# Patient Record
Sex: MALE | Race: WHITE | NOT HISPANIC OR LATINO | URBAN - METROPOLITAN AREA
[De-identification: names, ages, dates, MRNs, and addresses within clinical notes are randomized per-mention and may not be internally consistent; named-entity substitution may affect disease eponyms.]

---

## 2017-02-10 ENCOUNTER — OFFICE (OUTPATIENT)
Dept: URBAN - METROPOLITAN AREA CLINIC 70 | Facility: CLINIC | Age: 37
End: 2017-02-10

## 2017-02-10 VITALS
DIASTOLIC BLOOD PRESSURE: 68 MMHG | SYSTOLIC BLOOD PRESSURE: 120 MMHG | HEIGHT: 67 IN | WEIGHT: 185 LBS | HEART RATE: 72 BPM

## 2017-02-10 DIAGNOSIS — D64.9 ANEMIA, UNSPECIFIED: ICD-10-CM

## 2017-02-10 DIAGNOSIS — R10.11 RIGHT UPPER QUADRANT PAIN: ICD-10-CM

## 2017-02-10 DIAGNOSIS — R53.83 OTHER FATIGUE: ICD-10-CM

## 2017-02-10 DIAGNOSIS — R94.5 ABNORMAL RESULTS OF LIVER FUNCTION STUDIES: ICD-10-CM

## 2017-02-10 PROCEDURE — 99205 OFFICE O/P NEW HI 60 MIN: CPT | Performed by: INTERNAL MEDICINE

## 2017-02-10 NOTE — SERVICEHPINOTES
Patient here for evaluation. As you know, he saw you December 20, 2016 in the office. He has a history of vitamin D deficiency, hyperlipidemia. There is mention of recent onset of fatigue, muscle ache, loss of appetite. He is been using Advil to help treat some of his symptoms. He had also been mentioning some chills recently. Given his complaints of myalgia, chills, routine blood chemistries were performed.BRBlood work revealed a drop in hemoglobin. There was an approximate 2-3 g decrease. Further testing including iron studies, stool studies, LDH were requested.BRGiven his diminished appetite, recent NSAID use, drop in hemoglobin, he was advised to followup with us in the office. Next.CPK was found be normal. Interestingly, liver enzymes were elevated, AST 98, , alkaline phosphatase 259. Stool Hemoccults were found be negative. Viral hepatitis panel was ordered, negative for hepatitis A, B, C. Next.Followup hemoglobin actually showed improvement. Hemoglobin 13.0, increased up to 13.6. Ciera-Sanders, CMV titers have been ordered. Lipase ordered. Right upper quadrant ultrasound was also requested. Patient to have this study performed later today.patient states that over the past 2-3 weeks.  He's had fatigue.  This is somewhat new for him.  Normally he has lots of energy.  He states that he feels wiped out.  After coming home from work he would simply follow sleep.  He's been noticing some chills.  No fever.  Today he mentions he has some vague right upper quadrant discomfort, sensation.  He feels like his chills, body aches have improved but still feels somewhat fatigued.  He denies any sick contacts, travel.  No new medication.  No herbal supplements.  He does not drink alcohol.  As mentioned he has lost about 45 pounds, he is in a weight loss program since November of 2016.  He does not drink any alcohol.  He denies any history of gallbladder disease.  No liver disease.  He does not donate blood.  He denies any rectal bleeding, hematemesis.  He has been using some anti-inflammatories, 400 mg twice a day until recently when his hemoglobin was noted to be dropping.

## 2017-02-10 NOTE — SERVICENOTES
I spent over 60 minutes in patient care today. I reviewed and or requested patient records, imaging, labs.

## 2019-07-01 ENCOUNTER — OFFICE (OUTPATIENT)
Dept: URBAN - METROPOLITAN AREA CLINIC 75 | Facility: CLINIC | Age: 39
End: 2019-07-01

## 2019-07-01 VITALS
HEIGHT: 67 IN | WEIGHT: 223 LBS | HEART RATE: 84 BPM | SYSTOLIC BLOOD PRESSURE: 122 MMHG | DIASTOLIC BLOOD PRESSURE: 80 MMHG | RESPIRATION RATE: 16 BRPM

## 2019-07-01 DIAGNOSIS — R10.13 EPIGASTRIC PAIN: ICD-10-CM

## 2019-07-01 DIAGNOSIS — K21.9 GASTRO-ESOPHAGEAL REFLUX DISEASE WITHOUT ESOPHAGITIS: ICD-10-CM

## 2019-07-01 DIAGNOSIS — K30 FUNCTIONAL DYSPEPSIA: ICD-10-CM

## 2019-07-01 PROCEDURE — 99214 OFFICE O/P EST MOD 30 MIN: CPT | Performed by: INTERNAL MEDICINE

## 2019-08-01 VITALS
HEART RATE: 74 BPM | OXYGEN SATURATION: 99 % | SYSTOLIC BLOOD PRESSURE: 114 MMHG | DIASTOLIC BLOOD PRESSURE: 65 MMHG | HEART RATE: 77 BPM | HEART RATE: 98 BPM | RESPIRATION RATE: 18 BRPM | HEART RATE: 71 BPM | OXYGEN SATURATION: 93 % | RESPIRATION RATE: 15 BRPM | SYSTOLIC BLOOD PRESSURE: 149 MMHG | SYSTOLIC BLOOD PRESSURE: 142 MMHG | DIASTOLIC BLOOD PRESSURE: 95 MMHG | SYSTOLIC BLOOD PRESSURE: 153 MMHG | TEMPERATURE: 97.8 F | TEMPERATURE: 96.8 F | WEIGHT: 223 LBS | OXYGEN SATURATION: 94 % | SYSTOLIC BLOOD PRESSURE: 143 MMHG | DIASTOLIC BLOOD PRESSURE: 74 MMHG | OXYGEN SATURATION: 97 % | DIASTOLIC BLOOD PRESSURE: 83 MMHG | RESPIRATION RATE: 29 BRPM | RESPIRATION RATE: 16 BRPM | HEART RATE: 78 BPM | DIASTOLIC BLOOD PRESSURE: 80 MMHG | OXYGEN SATURATION: 100 % | RESPIRATION RATE: 20 BRPM | HEIGHT: 67 IN

## 2019-08-05 ENCOUNTER — OFFICE (OUTPATIENT)
Dept: URBAN - METROPOLITAN AREA PATHOLOGY 4 | Facility: PATHOLOGY | Age: 39
End: 2019-08-05

## 2019-08-05 ENCOUNTER — AMBULATORY SURGICAL CENTER (OUTPATIENT)
Dept: URBAN - METROPOLITAN AREA SURGERY 17 | Facility: SURGERY | Age: 39
End: 2019-08-05

## 2019-08-05 DIAGNOSIS — K22.70 BARRETT'S ESOPHAGUS WITHOUT DYSPLASIA: ICD-10-CM

## 2019-08-05 DIAGNOSIS — K21.0 GASTRO-ESOPHAGEAL REFLUX DISEASE WITH ESOPHAGITIS: ICD-10-CM

## 2019-08-05 DIAGNOSIS — D64.9 ANEMIA, UNSPECIFIED: ICD-10-CM

## 2019-08-05 DIAGNOSIS — R94.5 ABNORMAL RESULTS OF LIVER FUNCTION STUDIES: ICD-10-CM

## 2019-08-05 DIAGNOSIS — R10.13 EPIGASTRIC PAIN: ICD-10-CM

## 2019-08-05 DIAGNOSIS — K44.9 DIAPHRAGMATIC HERNIA WITHOUT OBSTRUCTION OR GANGRENE: ICD-10-CM

## 2019-08-05 DIAGNOSIS — R10.11 RIGHT UPPER QUADRANT PAIN: ICD-10-CM

## 2019-08-05 DIAGNOSIS — K29.50 UNSPECIFIED CHRONIC GASTRITIS WITHOUT BLEEDING: ICD-10-CM

## 2019-08-05 PROBLEM — K31.89 OTHER DISEASES OF STOMACH AND DUODENUM: Status: ACTIVE | Noted: 2019-08-05

## 2019-08-05 PROBLEM — K20.9 ESOPHAGITIS, UNSPECIFIED: Status: ACTIVE | Noted: 2019-08-05

## 2019-08-05 LAB
GI HISTOLOGY: A. UNSPECIFIED: (no result)
GI HISTOLOGY: B. UNSPECIFIED: (no result)
GI HISTOLOGY: PDF REPORT: (no result)

## 2019-08-05 PROCEDURE — 43239 EGD BIOPSY SINGLE/MULTIPLE: CPT | Performed by: INTERNAL MEDICINE

## 2019-08-05 PROCEDURE — 88305 TISSUE EXAM BY PATHOLOGIST: CPT | Performed by: INTERNAL MEDICINE

## 2019-08-05 RX ORDER — OMEPRAZOLE 20 MG/1
CAPSULE, DELAYED RELEASE ORAL
Qty: 30 | Refills: 0 | Status: ACTIVE
Start: 2019-08-05

## 2019-08-05 NOTE — SERVICEHPINOTES
ANDREA JUNG  is a  39  male   who presents today for a  EGD   for   the indications listed below.   Patient here for evaluation. As you know, he saw you December 20, 2016 in the office. He has a history of vitamin D deficiency, hyperlipidemia. There is mention of recent onset of fatigue, muscle ache, loss of appetite. He is been using Advil to help treat some of his symptoms. He had also been mentioning some chills recently. Given his complaints of myalgia, chills, routine blood chemistries were performed.BRBlood work revealed a drop in hemoglobin. There was an approximate 2-3 g decrease. Further testing including iron studies, stool studies, LDH were requested.BRGiven his diminished appetite, recent NSAID use, drop in hemoglobin, he was advised to followup with us in the office. Next.CPK was found be normal. Interestingly, liver enzymes were elevated, AST 98, , alkaline phosphatase 259. Stool Hemoccults were found be negative. Viral hepatitis panel was ordered, negative for hepatitis A, B, C. Next.Followup hemoglobin actually showed improvement. Hemoglobin 13.0, increased up to 13.6. Ciera-Sanders, CMV titers have been ordered. Lipase ordered. Right upper quadrant ultrasound was also requested. Patient to have this study performed later today.patient states that over the past 2-3 weeks. He's had fatigue. This is somewhat new for him. Normally he has lots of energy. He states that he feels wiped out. After coming home from work he would simply follow sleep. He's been noticing some chills. No fever. Today he mentions he has some vague right upper quadrant discomfort, sensation. He feels like his chills, body aches have improved but still feels somewhat fatigued. He denies any sick contacts, travel. No new medication. No herbal supplements. He does not drink alcohol. As mentioned he has lost about 45 pounds, he is in a weight loss program since November of 2016. He does not drink any alcohol. He denies any history of gallbladder disease. No liver disease. He does not donate blood. He denies any rectal bleeding, hematemesis. He has been using some anti-inflammatories, 400 mg twice a day until recently when his hemoglobin was noted to be dropping.ImpressionBRPatient with recent onset of fatigue, chills, body ache, myalgia, abnormal liver enzymes, 2-3 g drop in hemoglobin, elevated LDH.So far, I do not think were dealing with a gastrointestinal source for anemia. There are no signs of GI bleeding. Stool Hemoccults have been negative. Apparently he was ordered to have further stool Hemoccults, I would be okay if he held off on this for now. Again, there's been no signs of melena or obvious bleeding, especially with a 2-3 gram drop in Hgb. He was using very low dose NSAIDs. I would observe for any change in GI status in regards to anemia. I don't think we need to proceed with any type of endoscopy at this point.I suspect his drop in hemoglobin, current presentation is more due to a viral process. I agree with current workup for possible Ciera-Barr, CMV, et cetera.A hemolytic process would be supported with his increased LDH. Haptoglobin is pending. Not unusual to see a hematologic abnormality with underlying viral exposure. Not unusual to see abnormal liver function testing with underlying viral exposure.Reece does have some right upper quadrant tenderness, again this could fit with his underlying presentation. We will see what his RUQ u/s shows, this is to be done later today.FRED would follow with his LFTs over the next month or so, and suspect these will improve with time. I would avoid any ETOH for now. 7/1/2019 OFFICE NOTE:  states that 2 months ago he developed severe epigastric discomfort, worse after eating fatty food. Symptoms persisted for over a week. He had nocturnal problems, vomiting. Symptoms were worse for the initial 24 hours and then he just describe some epigastric soreness for about a week. Pain did not radiate. No fever no chills no melena. He try changing his diet which seem to help. A few weeks ago he had another episode but much milder and more brief. He tried his daughters ranitidine which seem to help. He's had previous gallbladder surgery many years ago for what sounds like biliary dyskinesia. Denies history of pancreatitis. Ultrasound April 2019 was unremarkable. LFTs, lipase normal. On further questioning, he admits to using regular ibuprofen since January to treat foot pain. He has since stopped. Currently he feels well he denies any significant epigastric discomfort. He is not using any further treatment medically. The updated Patient Profile was reviewed prior to the procedure, in conjunction with the Physical Exam, including medical conditions, surgical procedures, medications, allergies, family history and social history. See Physical Exam time stamp below for date and time of HPI completion.Pre-operatively, I reviewed the indication(s) for the procedure, the risks of the procedure [including but not limited to: unexpected bleeding possibly requiring hospitalization and/or unplanned repeat procedures, perforation possibly requiring surgical treatment, missed lesions and complications of sedation/MAC (also explained by anesthesia staff)]. I have evaluated the patient for risks associated with the planned anesthesia and the procedure to be performed and find the patient an acceptable candidate for IV sedation.Multiple opportunities were provided for any questions or concerns, and all questions were answered satisfactorily before any anesthesia was administered. We will proceed with the planned procedure.BR